# Patient Record
Sex: MALE | Race: WHITE | NOT HISPANIC OR LATINO | Employment: UNEMPLOYED | URBAN - METROPOLITAN AREA
[De-identification: names, ages, dates, MRNs, and addresses within clinical notes are randomized per-mention and may not be internally consistent; named-entity substitution may affect disease eponyms.]

---

## 2023-07-26 ENCOUNTER — HOSPITAL ENCOUNTER (EMERGENCY)
Facility: CLINIC | Age: 48
Discharge: HOME | End: 2023-07-26
Attending: PSYCHIATRY & NEUROLOGY | Admitting: PSYCHIATRY & NEUROLOGY
Payer: COMMERCIAL

## 2023-07-26 ENCOUNTER — TELEPHONE (OUTPATIENT)
Dept: ADDICTION MEDICINE | Facility: CLINIC | Age: 48
End: 2023-07-26
Payer: MEDICARE

## 2023-07-26 VITALS
TEMPERATURE: 98.7 F | OXYGEN SATURATION: 98 % | SYSTOLIC BLOOD PRESSURE: 148 MMHG | WEIGHT: 180 LBS | DIASTOLIC BLOOD PRESSURE: 91 MMHG | HEART RATE: 100 BPM | RESPIRATION RATE: 16 BRPM

## 2023-07-26 DIAGNOSIS — F31.9 BIPOLAR DISORDER, UNSPECIFIED (H): Primary | ICD-10-CM

## 2023-07-26 DIAGNOSIS — Z86.59 HISTORY OF BIPOLAR DISORDER: ICD-10-CM

## 2023-07-26 DIAGNOSIS — F43.22 ADJUSTMENT DISORDER WITH ANXIOUS MOOD: ICD-10-CM

## 2023-07-26 DIAGNOSIS — Z63.5 MARITAL CONFLICT INVOLVING ESTRANGEMENT: ICD-10-CM

## 2023-07-26 PROBLEM — F41.1 GAD (GENERALIZED ANXIETY DISORDER): Status: ACTIVE | Noted: 2023-07-26

## 2023-07-26 PROBLEM — F90.9 ADHD (ATTENTION DEFICIT HYPERACTIVITY DISORDER): Chronic | Status: ACTIVE | Noted: 2023-07-26

## 2023-07-26 PROBLEM — F31.2 BIPOLAR I DISORDER, MOST RECENT EPISODE MANIC, SEVERE WITH PSYCHOTIC FEATURES (H): Status: ACTIVE | Noted: 2023-07-26

## 2023-07-26 PROCEDURE — 99284 EMERGENCY DEPT VISIT MOD MDM: CPT | Performed by: PSYCHIATRY & NEUROLOGY

## 2023-07-26 PROCEDURE — 99285 EMERGENCY DEPT VISIT HI MDM: CPT | Mod: 25

## 2023-07-26 PROCEDURE — 90791 PSYCH DIAGNOSTIC EVALUATION: CPT

## 2023-07-26 SDOH — SOCIAL STABILITY - SOCIAL INSECURITY: DISRUPTION OF FAMILY BY SEPARATION AND DIVORCE: Z63.5

## 2023-07-26 ASSESSMENT — ACTIVITIES OF DAILY LIVING (ADL)
ADLS_ACUITY_SCORE: 35
ADLS_ACUITY_SCORE: 35

## 2023-07-26 NOTE — ED TRIAGE NOTES
Triage Assessment       Row Name 07/26/23 1205       Triage Assessment (Adult)    Airway WDL WDL       Respiratory WDL    Respiratory WDL WDL       Skin Circulation/Temperature WDL    Skin Circulation/Temperature WDL WDL       Cardiac WDL    Cardiac WDL WDL       Peripheral/Neurovascular WDL    Peripheral Neurovascular WDL WDL       Cognitive/Neuro/Behavioral WDL    Cognitive/Neuro/Behavioral WDL WDL

## 2023-07-26 NOTE — CONSULTS
"Diagnostic Evaluation Consultation  Crisis Assessment    Patient Name: Polo Luna  Age:  47 year old  Legal Sex: male  Gender Identity: male  Pronouns:   Race: White  Ethnicity: Not  or   Language: English      Patient was assessed: Virtual: iPad      Patient location: [facility] EMERGENCY DEPARTMENT                             [facility]ED[facility]-G    Referral Data and Chief Complaint  Polo Luna presents to the ED per community partner(s). Patient is presenting to the ED for the following concerns: Significant behavioral change, Anxiety, Paranoia, Worsening psychosocial stress.   Factors that make the mental health crisis life threatening or complex are:  Pt has a history of bipolar disorder, anxiety and ADHD.  Pt was seen by the [facility]  this morning and he told her that he was not taking his psychiatric medications since November, 2022.  Pt has ongoing marital issues with his wife and being paranoid that his wife was cheating on him with her coworker.  Pt also endorsed commanding auditory hallucination as the voices were telling him to, \"Go, sit down, stand up and eat.\"  Pt reportede having visual hallucination as seeing people in the corner of his eyes.  Pt denied having suicidal and homicidal ideations..      Informed Consent and Assessment Methods  Explained the crisis assessment process, including applicable information disclosures and limits to confidentiality, assessed understanding of the process, and obtained consent to proceed with the assessment.  Assessment methods included conducting a formal interview with patient, review of medical records, collaboration with medical staff, and obtaining relevant collateral information from family and community providers when available.  : done     Patient response to interventions: eager to participate, acceptance expressed, verbalizes understanding  Coping skills were attempted to reduce the crisis:  playing guitar, listening to " music, watching TV, movies, drawing, writing, deep breathing exercise, meditation, affirmations, praying, going to the gym to exercise.     History of the Crisis   Per Pt's wife, Pt had angry outburst yesterday as he was yelling and curing at his dad on the phone yesterday, then he yelled at her.  Pt has frequent arguments and fights with his wife about their finance, and Pt alleging that she was cheating on him with her coworker.  Pt also has not been taking his psychiatric medications consistently since November, 2022.    Brief Psychosocial History  Family:  , Children yes (Pt reported he has one daughter.)  Support System:  Parent(s), Other (specify) (Pt identified his close friend, Gage.)  Employment Status:  unemployed  Source of Income:  disability, social security  Financial Environmental Concerns:  unemployed, other (see comments) (Pt and his wife have been arguing about their finance.  Pt's wife wants to get divorce as she asked him to leave their apartment.)  Current Hobbies:  arts/crafts, exercise/fitness, group/social activities, music, poetry reading/writing, television/movies/videos  Barriers in Personal Life:  emotional concerns, financial concerns, lack of motivation, mental health concerns    Significant Clinical History  Current Anxiety Symptoms:  anxious, panic attack  Current Depression/Trauma:  withdrawl/isolation, impaired decision making  Current Somatic Symptoms:  anxious  Current Psychosis/Thought Disturbance:  impulsive, auditory hallucinations, visual hallucinations  Current Eating Symptoms:     Chemical Use History:  Alcohol: None  Benzodiazepines: None  Opiates: None  Cocaine: None  Marijuana: None  Other Use: None   Past diagnosis:  ADHD, Bipolar Disorder, Anxiety Disorder  Family history:  Anxiety Disorder, Depression  Past treatment:  Individual therapy, Inpatient Hospitalization, Psychiatric Medication Management  Details of most recent treatment:  Pt has his current  established outpatient psychiatry for medication management and 1x monthly individual therapy service.  Other relevant history:  Pt denied history of CD treatment but reported history of multiple psychiatric hospitalizations at [place] and [place].       Collateral Information  Is there collateral information: Yes     Collateral information name, relationship, phone number:  wife, Marisa Luna [phone]    What happened today: Marisa reported Pt had a big angry blowout yesterday as he yelled and cursed at his dad over the phone, then he yelled at her as he alleged she was cheating on him with her coworker.  Marisa reported Pt left their home last night to stay in hotel as he used her credit card to pay for the hotel.  Marisa reported Pt has been using her money to buy guitar, headphones and other musical equipments.     What is different about patient's functioning: Marisa reported Pt has poor sleep but normal eating.  Pt appeared to be isolating himself at home.  Marisa reported she just found out today when Pt told the [place]  that he has not been taking his psychiatric medications consistently since November, 2022.  Pt also told the [facility]  that he was having auditory and visual hallucinations.     Concern about alcohol/drug use: yes (Marisa reported Pt used CBD Gummy and cough medicine in the past.)    What do you think the patient needs:      Has patient made comments about wanting to kill themselves/others: no (Marisa reported Pt made threat to his dad yesterday when he talked to him on the phone that he was going to cut him in half.)    If d/c is recommended, can they take part in safety/aftercare planning:  no (Marisa reported she cannot deal with Pt anymore as she did not feel safe having Pt return home today.  Marisa reported she was kicking him out of their apartment.)    Additional collateral information:        Risk Assessment  [place] Suicide Severity Rating Scale Full  Clinical Version:  Suicidal Ideation  Q1 Wish to be Dead (Lifetime): No  Q2 Non-Specific Active Suicidal Thoughts (Lifetime): No     Suicidal Behavior (Lifetime)  Actual Attempt (Lifetime): Yes  Total Number of Actual Attempts (Lifetime): 0  Actual Attempt Description (Lifetime): None reported.  Has subject engaged in non-suicidal self-injurious behavior? (Lifetime): No  Interrupted Attempts (Lifetime): No  Aborted or Self-Interrupted Attempt (Lifetime): No  Preparatory Acts or Behavior (Lifetime): No    [place] Suicide Severity Rating Scale Recent:   Suicidal Ideation (Recent)  Q1 Wished to be Dead (Past Month): no  Q2 Suicidal Thoughts (Past Month): no  Q3 Suicidal Thought Method: no  Q4 Suicidal Intent without Specific Plan: no  Q5 Suicide Intent with Specific Plan: no  Level of Risk per Screen: low risk     Suicidal Behavior (Recent)  Actual Attempt (Past 3 Months): No  Total Number of Actual Attempts (Past 3 Months): 0  Actual Attempt Description (Past 3 Months): None reported.  Has subject engaged in non-suicidal self-injurious behavior? (Past 3 Months): No  Interrupted Attempts (Past 3 Months): No  Total Number of Interrupted Attempts (Past 3 Months): 0  Aborted or Self-Interrupted Attempt (Past 3 Months): No  Total Number of Aborted or Self-Interrupted Attempts (Past 3 Months): 0  Preparatory Acts or Behavior (Past 3 Months): No  Total Number of Preparatory Acts (Past 3 Months): 0    Environmental or Psychosocial Events: legal issues such as DWI, DUI, lawsuit, CPS involvement, etc., loss of a relationship due to divorce/separation, threats to a prized relationship, challenging interpersonal relationships, impulsivity/recklessness, unemployment/underemployment, unstable housing, homelessness, other life stressors, neither working nor attending school, social isolation  Protective Factors: Protective Factors: lives in a responsibly safe and stable environment, able to access care without barriers, help  seeking, supportive ongoing medical and mental health care relationships, constructive use of leisure time, enjoyable activities, resilience    Does the patient have thoughts of harming others? Feels Like Hurting Others: no  Previous Attempt to Hurt Others: no  Current presentation:  (Pt was calm, alert, oriented, engaged and cooperative.)  Is the patient engaging in sexually inappropriate behavior?: no    Is the patient engaging in sexually inappropriate behavior?  no        Mental Status Exam   Affect: Constricted  Appearance: Appropriate  Attention Span/Concentration: Attentive  Eye Contact: Engaged, Variable    Fund of Knowledge: Appropriate   Language /Speech Content: Fluent  Language /Speech Volume: Normal  Language /Speech Rate/Productions: Normal  Recent Memory: Variable  Remote Memory: Variable  Mood: Anxious  Orientation to Person: Yes   Orientation to Place: Yes  Orientation to Time of Day: Yes  Orientation to Date: Yes     Situation (Do they understand why they are here?): Yes  Psychomotor Behavior: Normal  Thought Content: Clear, Hallucinations, Paranoia  Thought Form: Intact, Paranoia     Mini-Cog Assessment  Number of Words Recalled:    Clock-Drawing Test:     Three Item Recall:    Mini-Cog Total Score:       Medication  Psychotropic medications:   Medication Orders - Psychiatric (From admission, onward)      None             Current Care Team  Patient Care Team:  Althea Perez MD as PCP - General (Internal Medicine)    Diagnosis  Patient Active Problem List   Diagnosis Code    Premature ejaculation F52.4    Bipolar I disorder, most recent episode manic, severe with psychotic features (H) F31.2    MISSY (generalized anxiety disorder) F41.1    ADHD (attention deficit hyperactivity disorder) F90.9       Primary Problem This Admission  Active Hospital Problems    Bipolar I disorder, most recent episode manic, severe with psychotic features (H)      MISSY (generalized anxiety disorder)      ADHD (attention  "deficit hyperactivity disorder)        Clinical Summary and Substantiation of Recommendations   Pt is a 47 year old White male with a histroy of bipolar disorder, psychosis, anxiety and ADHD.  Pt presenting in the ER today by [facility] , due to worsening of anxiety, paranoia and delusion.  Pt denied having depression but endorsed anxiety symptoms.  Pt reported having normal sleep and good appetite.  Pt endorsed paranoia and delusion as he believed his wife has been cheating on him with her coworker.  Pt endorsed positive commanding auditory hallucination as the voices were telling him to, \"Go, sitdown, stand, eat.\"  Pt endorsed visual hallucination of seeing people in the corner of his eyes.  Pt denied having suicidal and homicidal ideations.  Pt identified having ongoing marital issues with his wife as they often argue about their finances and Pt alleging that she has been cheating on him with her coworker as triggers leading to his current mental health crisis.  Pt also stopped taking his psychiatric medications since November, 2022.  Pt has no acute tha and psychosis which require psychiatric hospitalization.  Pt was able to identify coping skills and support system to mitigate his current mental health crisis.  Pt was able to engage in his DEC Aftercare plan as he felt safe to return to the community.  Pt was appropriate for outpatient mental health services.                          Patient coping skills attempted to reduce the crisis:  playing guitar, listening to music, watching TV, movies, drawing, writing, deep breathing exercise, meditation, affirmations, praying, going to the gym to exercise.    Disposition  Recommended disposition: Individual Therapy, Medication Management, Family Therapy, Group Home        Reviewed case and recommendations with attending provider. Attending Name: Arnie Hurst MD       Attending concurs with disposition: yes       Patient and/or validated legal guardian " concurs with disposition:   yes       Final disposition:  discharge    Legal status on admission:      Assessment Details   Total duration spent on the patient case in minutes: 30 min     CPT code(s) utilized: 14800 - Psychotherapy for Crisis - 60 (30-74*) min    Km [facility], Psychotherapist  DEC - Triage & Transition Services  Callback: [phone]

## 2023-07-26 NOTE — TELEPHONE ENCOUNTER
10:48 AM: [facility] called wanting to provide collateral and reported that they went to see Pt for an assessment . Pt has not been taking meds and presents with manic symptoms. Pt is diagnosed with Bipolar. Pt's wife also reporting similar concerns. Pt would like to stabilize on MH and get back on medications. [facility] staff is transporting Pt to [facility] ED.

## 2023-07-26 NOTE — DISCHARGE INSTRUCTIONS
Aftercare Plan  If I am feeling unsafe or I am in a crisis, I will: reach out to my parents, close friend, Gage and [facility] crisis line for their support.  Contact my established care providers   Call the National Suicide Prevention Lifeline: 988  Go to the nearest emergency room   Call 911     Writer encourage Pt to take his medications as prescribed and keep all of scheduled appointments with his outpatient service providers.  Pt was appropriate for Crisis Bed service placement, however Pt has medicare insurance.  Writer recommended Pt to continue follow up with his current outpatient psychiatry for medication management and individual therapy services.  Writer recommend Pt to engage in his current therapy session to 1x weekly rather than 1x monthly to increase support.  Writer also recommended family therapy service to address marital issues, however, Pt's wife declined to participate.  Pt reported he can go stay in a hotel as his parents can support him.  DEC coordinator will contact Pt within 1 or 2 business days to ensure coordination of care and provide assistance with appointments.    Warning signs that I or other people might notice when a crisis is developing for me: increased depression, isolation, anxiety, marital conflict, psychosis, tha, substance use, disrupted sleep and appetite.    Things I am able to do on my own to cope or help me feel better: playing guitar, listening to music, journaling, watching TV, movies, drawing, going to the gym to exercise, deep breathing exercise, meditation, affirmations and praying.     Things that I am able to do with others to cope or help me better: playing music in a band and socializing with friends.     Things I can use or do for distraction: playing guitar, listening to music, journaling, watching TV, movies, drawing, going to the gym to exercise, deep breathing exercise, meditation, affirmations and praying.      Changes I can make to support my mental health  and wellness: Taking my psychiatric medications consistently as prescribed.  Joining a peer support group with HEIDI KELLER to increase positive support system.  [facility] (National [place] on Mental Illness) improves the lives of children and adults with mental illnesses and their families by providing free classes on mental illnesses and support groups for adults with mental illnesses, parents and family members. For more information:  Phone: [phone]  Toll free: 4-205-NGQK-HELPS  Website: [email or web address] [email or web address]/      People in my life that I can ask for help: my parents and close friend, Gage.     Your [facility] has a mental health crisis team you can call 24/7: [facility] Mobile Crisis  [phone]     Other things that are important when I'm in crisis: support from my family and friends.  National Suicide Prevention Lifeline at 988  Throughout  [place]: call **CRISIS (**087830)  Crisis Text Line: is available for free, 24/7 by texting [place] to 575459    Additional resources and information: Below is a list of FREE Mental Health Options in the [facility] Area:    [facility] ([facility]  Serves those in emotional crisis with 24-hour, seven-day-a-week crisis counseling, assessment, referral, and medication management.   Suicidal: [phone] Consultation: [phone]  [address] [place] [address] 24/7 Crisis Intervention Center     Walk-in Counseling Center  [phone]  Serves those in need of free outpatient mental health care  Hours: Mon, Wed, Fri 1-3pm; Mon-Thurs 6:30-8:30pm    [facility] for Mental Health  [address] [place] [address]  [phone]     For shelter tonight, start with Adult Shelter Connect Overnight Shelter: [phone]  *Phone lines are closed between 5:30-7:30 pm    [address] (enter on the [address] side near the [facility] corner)  Walk-in Hours: 9 am - 5:30 pm Monday-Friday and 1 pm - 5:30 pm Saturday  and Sunday    [facility]  [phone]  [address] [facility]    [facility]  [phone]  [address] [facility]    [facility]  [phone]  [address] [facility]      [facility]  [phone]  [address] [facility]  [phone]  [address] [place] [address]    To start making a plan for a more long-term solution, contact the Coordinated Entry Homeless Assistance Program:    Coordinated Entry Homeless Assistance  Joint venture between [facility]  [address] [place] [address]  [phone]  Open Wednesdays and Thursdays 8 am-2 pm  The Coordinated Entry System is the [facility]'s approach to organizing and providing housing services for people experiencing homelessness in [facility].  Because housing resources are limited, this process is designed to ensure that individuals and families with the highest vulnerability, service needs, and length of homelessness receive top priority in housing placement.    Assessment changes due to COVID-19 virus    [facility] Services family assessors will now be conducting assessments by phone. If you are working with a family staying in a place other than a [facility]-funded shelter and is eligible for Coordinated Entry, continue to contact [facility]  at [phone] to set up an assessment.    For single adults, [facility] will be suspending drop-in hours at [facility]. To have a Coordinated Entry assessment completed, call the [facility]' certified assessors: Christiano at [phone] or Susanna at [phone] directly to set up a time to meet    Call 211 at any time on any day for questions about services and resources available to you.      Family Shelters    [facility] Shelter Team  [phone]  [address] [facility], Floors 5 & 6              Youth, families & disabled adults    Hours: Mon-Fri 8:00 am-4:30  pm.  After-Hours Shelter Team  [address]      Drop-Ins    [facility]  [phone]  [address] ([facility])              Showers/Laundry (8-11am)              Health Care              Employment Services              Breakfast (7-8 am)              Lunch (11:30am-12:30pm)    Hours: Mon-Sat: Summer 7am-3pm; Winter 7am-4pm.      [facility] [phone]  [address] [place] [address]  Hours: Mon, Wed and Fri 9:00-11:30 am      Clothing    [facility]  [phone]  [address]  Hours: M-Th 10-11:30am & 1:30-3:30pm; Sat/Sun 9:30-10:30 am      Free Meals & Showers    [facility]  [phone]  [address] [place] [address]  Dinner: Mon-Fri 5:30-6:30pm (No showers)    [facility]  [phone]  Meals ([address]): Women & Children M-F 8:30-9am; Everyone: M-F 12-1pm; Sat/Sun, 10:30-11:30 am  Showers ([address]): Mon-Fri 9-10 am    [facility]  [phone]  [address]  Dinner: Thurs - Mon 6 pm  Showers: Daily 8 - 4:30 pm    Health Care    [facility] for the [facility]eless  [phone]  Clinics are in [address] [place] shelters/drop-in centers.  Hours: Mon-Fri at varying sites. Call for sites/times. No insurance or appts required to receive care.  Clinic sites: [facility], [facility], [facility], [facility], [facility], [facility], [facility], [facility], [facility], [facility]         Crisis Lines  Crisis Text Line  Text 212006  You will be connected with a trained live crisis counselor to provide support.    Por rudy, texto  BELINDA a 831219 o texto a 442-AYUDAME en WhatsApp    [employer] (LGBTQ Youth Crisis Line)  8.992.383.9020  text START to 146-478      Community Resources  Fast Tracker  Linking people to mental health and substance use disorder resources  Hexagon.org     [place] Mental Health Warm Line  Peer to peer support  " Monday thru Saturday, 12 pm to 10 pm  814.575.2297 or 8.579.889.6589  Text \"Support\" to 02728    National [place] on Mental Illness (KRISHNA)  915.470.6861 or 1.888.KRISHNA.HELPS      Mental Health Apps  My3  https://Heliotrope Technologiespp.org/    VirtualHopeBox  https://Euroffice/apps/virtual-hope-box/      Additional Information  Today you were seen by a licensed mental health professional through Triage and Transition services, [facility] Providers ([facility])  for a crisis assessment in the Emergency Department at [facility].  It is recommended that you follow up with your established providers (psychiatrist, mental health therapist, and/or primary care doctor - as relevant) as soon as possible. Coordinators from [facility] will be calling you in the next 24-48 hours to ensure that you have the resources you need.  You can also contact [facility] coordinators directly at [phone]. You may have been scheduled for or offered an appointment with a mental health provider. [facility] maintains an extensive network of licensed behavioral health providers to connect patients with the services they need.  We do not charge providers a fee to participate in our referral network.  We match patients with providers based on a patient's specific needs, insurance coverage, and location.  Our first effort will be to refer you to a provider within your care system, and will utilize providers outside your care system as needed.         "

## 2023-07-26 NOTE — ED PROVIDER NOTES
ED Provider Note  [facility]      History     Chief Complaint   Patient presents with    Delusional     Wife told him to come in to be admitted to the psych hernandez as he is having delusional thoughts of his wife cheating on him     HPI  Polo Luna is a 47 year old male who is here due to concerns for unstable bipolar symptoms, that he is paranoid and delusional that his wife was having an affair.  Patient had yelled at wife as he alleged she was cheating on him with her coworker.  Wife reported patient left their home last night to stay in hotel as he used her credit card to pay for the hotel, that he has been using her money to buy guitar, headphones and other musical equipments.      Patient has poor sleep but normal eating.  He allegedly is isolating himself at home. [facility]  evaluated and determined that he has not been taking his psychiatric medications consistently since November, 2022. He admits to not taking his risperidone, but has continued with his lithium, klonopin and Adderall. Patient also told the KERRI  that he was having auditory and visual hallucinations. He however reports the hallucinations have been chronic and are not bothersome. He denies any command hallucinations.    Patient does not feel he needs to be here nor hospitalized nor have an intervention. He understands that his wife does not want him home and he plans on going to a hotel.    Past Medical History  Past Medical History:   Diagnosis Date    Bipolar disorder (H)      Past Surgical History:   Procedure Laterality Date    ANKLE SURGERY      right     ClonAZEPAM (KLONOPIN) 0.5 MG tablet  lithium (ESKALITH) 450 MG CR tablet  quetiapine (SEROQUEL) 100 MG tablet  quetiapine (SEROQUEL) 200 MG tablet  zolpidem (AMBIEN) 5 MG tablet      No Known Allergies  Family History  No family history on file.  Social History   Social History     Tobacco Use    Smoking status: Passive Smoke Exposure -  Never Smoker   Substance Use Topics    Alcohol use: Yes    Drug use: No         A medically appropriate review of systems was performed with pertinent positives and negatives noted in the HPI, and all other systems negative.    Physical Exam   BP: (!) 148/91  Pulse: 100  Temp: 98.7  F (37.1  C)  Resp: 16  Weight: 81.6 kg (180 lb)  SpO2: 98 %  Physical Exam  Vitals and nursing note reviewed.   HENT:      Head: Normocephalic.   Eyes:      Pupils: Pupils are equal, round, and reactive to light.   Pulmonary:      Effort: Pulmonary effort is normal.   Musculoskeletal:         General: Normal range of motion.      Cervical back: Normal range of motion.   Neurological:      General: No focal deficit present.      Mental Status: He is alert.   Psychiatric:         Attention and Perception: Attention and perception normal.         Mood and Affect: Mood and affect normal.         Speech: Speech normal.         Behavior: Behavior normal. Behavior is not agitated, aggressive, hyperactive or combative. Behavior is cooperative.         Thought Content: Thought content normal. Thought content is not paranoid or delusional. Thought content does not include homicidal or suicidal ideation.         Cognition and Memory: Cognition and memory normal.         Judgment: Judgment normal.           ED Course, Procedures, & Data      Procedures       ED Course Selections: A consult was attained from the [facility] service. The case was discussed with the  from that service. The consulting service's recommendations were provided at 2:30 PM.  10 minute spent discussing case, care and disposition.  10 minutes spent reviewing prior records and interventions.        No results found for any visits on 07/26/23.  Medications - No data to display  Labs Ordered and Resulted from Time of ED Arrival to Time of ED Departure - No data to display  No orders to display          Critical care was not performed.     Medical Decision Making  The patient's  presentation was of high complexity (a chronic illness severe exacerbation, progression, or side effect of treatment).    The patient's evaluation involved:  an assessment requiring an independent historian (see separate area of note for details)  review of external note(s) from 3+ sources (see separate area of note for details)    The patient's management necessitated high risk (a decision regarding hospitalization).    Assessment & Plan    Patient is here due to concerns that he may be delusion and paranoid regarding his wife's infidelity. He has history of bipolar disorder and there was concern that he has not been taking his meds. Patient admits to not taking risperidone but continues with lithium. He has been taking his prescribed klonopin and Adderall.    Patient does not exhibit delusional thinking nor appears unstable with any bipolar symptoms or signs. He agrees that his wife is not having an affair. Patient would like to be discharged. I do not find patient holdable as there is no evidence of acute safety concern nor grave disability. He was discharged. He was encouraged to follow-up established care and services.    I have reviewed the nursing notes. I have reviewed the findings, diagnosis, plan and need for follow up with the patient.    New Prescriptions    No medications on file       Final diagnoses:   Adjustment disorder with anxious mood   History of bipolar disorder   Marital conflict involving estrangement       Arnie Hurst MD  [facility] EMERGENCY DEPARTMENT  7/26/2023     Arnie Hurst MD  07/26/23 3247

## 2023-08-12 ENCOUNTER — HOSPITAL ENCOUNTER (EMERGENCY)
Facility: CLINIC | Age: 48
Discharge: HOME | End: 2023-08-13
Attending: STUDENT IN AN ORGANIZED HEALTH CARE EDUCATION/TRAINING PROGRAM | Admitting: STUDENT IN AN ORGANIZED HEALTH CARE EDUCATION/TRAINING PROGRAM
Payer: COMMERCIAL

## 2023-08-12 DIAGNOSIS — F41.0 PANIC ATTACK: ICD-10-CM

## 2023-08-12 PROCEDURE — 99284 EMERGENCY DEPT VISIT MOD MDM: CPT | Performed by: STUDENT IN AN ORGANIZED HEALTH CARE EDUCATION/TRAINING PROGRAM

## 2023-08-12 PROCEDURE — 99285 EMERGENCY DEPT VISIT HI MDM: CPT | Mod: 25 | Performed by: STUDENT IN AN ORGANIZED HEALTH CARE EDUCATION/TRAINING PROGRAM

## 2023-08-12 PROCEDURE — 250N000013 HC RX MED GY IP 250 OP 250 PS 637: Performed by: STUDENT IN AN ORGANIZED HEALTH CARE EDUCATION/TRAINING PROGRAM

## 2023-08-12 RX ORDER — OLANZAPINE 5 MG/1
5 TABLET, ORALLY DISINTEGRATING ORAL ONCE
Status: COMPLETED | OUTPATIENT
Start: 2023-08-12 | End: 2023-08-12

## 2023-08-12 RX ADMIN — OLANZAPINE 5 MG: 5 TABLET, ORALLY DISINTEGRATING ORAL at 22:24

## 2023-08-12 ASSESSMENT — ACTIVITIES OF DAILY LIVING (ADL): ADLS_ACUITY_SCORE: 35

## 2023-08-13 VITALS
BODY MASS INDEX: 26.53 KG/M2 | DIASTOLIC BLOOD PRESSURE: 87 MMHG | OXYGEN SATURATION: 99 % | SYSTOLIC BLOOD PRESSURE: 129 MMHG | TEMPERATURE: 97.9 F | RESPIRATION RATE: 20 BRPM | WEIGHT: 185.3 LBS | HEART RATE: 87 BPM | HEIGHT: 70 IN

## 2023-08-13 PROCEDURE — 90791 PSYCH DIAGNOSTIC EVALUATION: CPT

## 2023-08-13 RX ORDER — HYDROXYZINE HYDROCHLORIDE 25 MG/1
25 TABLET, FILM COATED ORAL 3 TIMES DAILY PRN
Qty: 10 TABLET | Refills: 0 | Status: SHIPPED | OUTPATIENT
Start: 2023-08-13

## 2023-08-13 RX ORDER — OLANZAPINE 10 MG/1
10 TABLET, ORALLY DISINTEGRATING ORAL 2 TIMES DAILY PRN
Qty: 30 TABLET | Refills: 0 | Status: SHIPPED | OUTPATIENT
Start: 2023-08-13

## 2023-08-13 ASSESSMENT — ACTIVITIES OF DAILY LIVING (ADL): ADLS_ACUITY_SCORE: 35

## 2023-08-13 NOTE — CONSULTS
"Diagnostic Evaluation Consultation  Crisis Assessment    Patient Name: Polo Luna  Age:  47 year old  Legal Sex: male  Gender Identity: male  Pronouns:   Race: White  Ethnicity: Not  or   Language: English      Patient was assessed: Virtual: [employer] Telemedicine Start Time: 0020 Telemedicine Stop Time: 0054  Patient location: [facility] EMERGENCY DEPARTMENT                                 Referral Data and Chief Complaint  Polo Luna presents to the ED by  self. Patient is presenting to the ED for the following concerns: Significant behavioral change, Anxiety, Paranoia, Worsening psychosocial stress, Suicidal ideation, Substance use.   Factors that make the mental health crisis life threatening or complex are:  Pt has a history of bipolar disorder, anxiety and ADHD.  Pt reports having multiple panic attacks and unamanageable anxiety after he stopped using cough medicine. He states he stopped using cough medicine yesteday. He also reprots running out of his anxiety medicine. Pt has ongoing marital issues with his wife and being paranoid that his wife was cheating on him with her coworker.  Pt also endorsed commanding auditory hallucination as the voices were telling him to, \"Go, sit down, stand up and eat.\"  Pt reported having visual hallucination as seeing people in the corner of his eyes.  Pt endorsed having suicidal ideations but denies having plans or intent..      Informed Consent and Assessment Methods  Explained the crisis assessment process, including applicable information disclosures and limits to confidentiality, assessed understanding of the process, and obtained consent to proceed with the assessment.  Assessment methods included conducting a formal interview with patient, review of medical records, collaboration with medical staff, and obtaining relevant collateral information from family and community providers when available.  : done     Patient response to " "interventions: eager to participate, acceptance expressed, verbalizes understanding  Coping skills were attempted to reduce the crisis:  playing guitar, listening to music, watching TV, movies, drawing, writing, deep breathing exercise, meditation, affirmations, praying, going to the gym to exercise.     History of the Crisis   Patient reports he stopped taking cough syrup yesterday and has been having ongoing panic attacks since. Patient states his anxiety meds \"went missing\" Patient reports ongoing symptoms of tha, depressive symptoms and suicidal ideations.    Brief Psychosocial History  Family:  , Children    Support System:  Wife  Employment Status:  disabled, unemployed  Source of Income:  disability, social security  Financial Environmental Concerns:  unemployed, other (see comments)  Current Hobbies:  music, other (see comments) (Play guitar)  Barriers in Personal Life:  emotional concerns, financial concerns, lack of motivation, mental health concerns, behavioral concerns    Significant Clinical History  Current Anxiety Symptoms:  anxious, panic attack, excessive worry, racing thoughts, shortness of breath or racing heart  Current Depression/Trauma:  difficulty concentrating, withdrawl/isolation, helplessness, hoplessness, sadness, low self esteem, irritable, avoidance, crying or feels like crying, thoughts of death/suicide  Current Somatic Symptoms:  excessive worry, racing thoughts, shortness of breath or racing heart, anxious, sweating, flushing, shaking  Current Psychosis/Thought Disturbance:  impulsive, distractability, flight of ideas, agitation, visual hallucinations, auditory hallucinations  Current Eating Symptoms:  loss of appetite  Chemical Use History:  Other Use: Cold Meds  Last Use:: 08/11/23  Withdrawal Symptoms:  (cravings.)   Past diagnosis:  ADHD, Bipolar Disorder, Anxiety Disorder, Depression  Family history:     Past treatment:     Details of most recent treatment:  Pt has his " current established outpatient psychiatry for medication management and 1x monthly individual therapy service. Patient is currently looking referrals for day treatment program.  Other relevant history:  Pt denied history of CD treatment but reported history of multiple psychiatric hospitalizations at [place] and [facility].       Collateral Information  Is there collateral information: no.     Collateral information name, relationship, phone number:  Marisa Luna, spouse @ [phone].  was unable to reach her. Left a vm and requested a call back.       Risk Assessment  Rankin Suicide Severity Rating Scale Full Clinical Version:  Suicidal Ideation  Q6 Suicide Behavior (Lifetime): no         Rankin Suicide Severity Rating Scale Recent:   Suicidal Ideation (Recent)  Q1 Wished to be Dead (Past Month): yes  Q2 Suicidal Thoughts (Past Month): yes  Q3 Suicidal Thought Method: no  Q4 Suicidal Intent without Specific Plan: no  Q5 Suicide Intent with Specific Plan: no  Level of Risk per Screen: low risk  Intensity of Ideation (Recent)  Most Severe Ideation Rating (Past 1 Month): 2  Description of Most Severe Ideation (Past 1 Month): reports he was feeling overwhelmed with everything. Reports everything was happening at once.  Suicidal Behavior (Recent)  Actual Attempt (Past 3 Months): No  Interrupted Attempts (Past 3 Months): No  Aborted or Self-Interrupted Attempt (Past 3 Months): No  Preparatory Acts or Behavior (Past 3 Months): No    Environmental or Psychosocial Events: legal issues such as DWI, DUI, lawsuit, CPS involvement, etc., loss of a relationship due to divorce/separation, threats to a prized relationship, challenging interpersonal relationships, impulsivity/recklessness, unemployment/underemployment, other life stressors, neither working nor attending school, social isolation, helplessness/hopelessness, excessive debt, poor finances, recent life events (see comment)  Protective Factors: Protective  Factors: lives in a responsibly safe and stable environment, able to access care without barriers, help seeking, supportive ongoing medical and mental health care relationships, constructive use of leisure time, enjoyable activities, resilience    Does the patient have thoughts of harming others? Previous Attempt to Hurt Others: no  Is the patient engaging in sexually inappropriate behavior?: no    Is the patient engaging in sexually inappropriate behavior?  no        Mental Status Exam   Affect: Constricted  Appearance: Appropriate  Attention Span/Concentration: Attentive  Eye Contact: Engaged, Variable    Fund of Knowledge: Appropriate   Language /Speech Content: Fluent  Language /Speech Volume: Normal  Language /Speech Rate/Productions: Normal  Recent Memory: Variable  Remote Memory: Variable  Mood: Anxious  Orientation to Person: Yes   Orientation to Place: Yes  Orientation to Time of Day: Yes  Orientation to Date: Yes     Situation (Do they understand why they are here?): Yes  Psychomotor Behavior: Normal  Thought Content: Clear, Hallucinations, Paranoia, Suicidal  Thought Form: Intact, Paranoia     Mini-Cog Assessment  Number of Words Recalled:    Clock-Drawing Test:     Three Item Recall:    Mini-Cog Total Score:       Medication  Psychotropic medications:   Medication Orders - Psychiatric (From admission, onward)      Start     Dose/Rate Route Frequency Ordered Stop    08/13/23 0000  OLANZapine zydis (ZYPREXA) 10 MG ODT         10 mg Oral 2 TIMES DAILY PRN 08/13/23 0104      08/13/23 0000  hydrOXYzine (ATARAX) 25 MG tablet         25 mg Oral 3 TIMES DAILY PRN 08/13/23 0104               Current Care Team  Patient Care Team:  Althea Perez MD as PCP - General (Internal Medicine)  Justyna- as Therapist    Diagnosis  Patient Active Problem List   Diagnosis Code    Premature ejaculation F52.4    Bipolar I disorder, most recent episode manic, severe with psychotic features (H) F31.2    MISSY (generalized anxiety  "disorder) F41.1    ADHD (attention deficit hyperactivity disorder) F90.9       Primary Problem This Admission  Active Hospital Problems  Bipolar disorder, current episode mixed; severe with psychotic features - (F31.64)  Generalized anxiety disorder - (F41.1)  Unspecified attention-deficit/hyperactivity disorder - (F90.9)    Clinical Summary and Substantiation of Recommendations   Pt is a 47 year old White male with a histroy of bipolar disorder, psychosis, anxiety and ADHD. Pt presenting in the ER today due to worsening panic attacks. anxiety, paranoia and suicidal ideations. Patient reports feeling depressed, hopeless and helpless. Pt reported having normal sleep and loss of appetite. Pt endorsed paranoia and delusion as he believed his wife has been cheating on him with her coworker. Pt endorsed positive commanding auditory hallucination as the voices were telling him to, \"Go, sitdown, stand, eat.\" Pt endorsed visual hallucination of seeing people in the corner of his eyes. Pt identified having ongoing marital issues with his wife as they often argue about their finances and Pt alleging that she has been cheating on him with her coworker as triggers leading to his current mental health crisis. Patient reports he also has been struggling with manic symptoms. He states he somes feels high energy and has been spending money by buying things. He reports he takes his medications as prescribed. Pt has no acute tha and psychosis which require psychiatric hospitalization. Pt was able to identify coping skills and support system to mitigate his current mental health crisis. Pt was able to engage in his [facility] Aftercare plan as he felt safe to return to the community. Pt was appropriate for outpatient mental health services. Patient has a psychiatry appointment scheduled for 8/16. He reports seeing his therapist weekly. Patient is interested in getting referrals for day treatment programming. Behavioral health care " coordination team will reach out to patient and help schedule a day treatment intake appointment within the next 48 hours.      Patient coping skills attempted to reduce the crisis:  playing guitar, listening to music, watching TV, movies, drawing, writing, deep breathing exercise, meditation, affirmations, praying, going to the gym to exercise.    Disposition  Recommended disposition: Individual Therapy, Medication Management, Programmatic Care        Reviewed case and recommendations with attending provider. Attending Name: Alo Albarran MD       Attending concurs with disposition: yes       Patient and/or validated legal guardian concurs with disposition:   yes       Final disposition:  discharge    Legal status on admission:      Assessment Details   Total duration spent on the patient case in minutes: 34 min     CPT code(s) utilized: 25295 - Psychotherapy for Crisis - 60 (30-74*) min    RENETTA Albrecht, Psychotherapist  [facility] - Triage & Transition Services  Callback: [phone]

## 2023-08-13 NOTE — DISCHARGE INSTRUCTIONS
You were seen today for a panic attack. As we discussed  - take Zyprexa and hydroxyzine as needed for anxiety  - Follow up with psychiatry as outpatient, they are trying to set up outpatient therapy for you and will call you.  - Please return to the emergency department if you have severe worsening in your symptoms

## 2023-08-13 NOTE — ED PROVIDER NOTES
"ED Provider Note  [facility]      History     Chief Complaint   Patient presents with    Panic Attack    Addiction Problem    Suicidal     SI x2 days. Denies a plan        HPI    Polo Luna is a 47 year old year old with history of bipolar 1 disorder who presents to the ED for a psych eval. he feels depressed but has no current plan for suicide.  He is feeling like things are getting out of control and wants more help.  he was kicked out by his wife 4 weeks ago which has made his anxiety worse. 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 He is looking for detox or treatment for it. He says he has been admitted before. He ran out of Klonopin for anxiety 2 days ago. Denies drinking alcohol.  He said today he was alone which made his symptoms worse which is why he came in.  He is also interested in being admitted for observation, looking for psychiatric help.    A medically appropriate review of systems was performed with pertinent positives and negatives noted in the HPI, and all other systems negative.    Physical Exam   BP (!) 157/99   Pulse 80   Temp 97.9  F (36.6  C) (Oral)   Resp 20   Ht 1.778 m (5' 10\")   Wt 84.1 kg (185 lb 4.8 oz)   SpO2 100%   BMI 26.59 kg/m     Physical Exam  General: no acute distress.  HENT: MMM, no oropharyngeal lesions  Eyes: PERRL, normal sclerae  Neck: non-tender, supple  Cardio: Regular rate and rhythm. Extremities well perfused  Resp: Normal work of breathing, no accessory muscle use  Abdomen: non tender, non-distended, no rebound, no guarding  Neuro: alert and oriented. Grossly normal strength and sensation in all extremities.   MSK: no deformities. Grossly normal ROM in extremities.   Integumentary/Skin: no rashes or lesions      Procedures, & Data      Procedures    Critical care was not performed.     Medical Decision " Making  The patient's presentation was of moderate complexity (an undiagnosed new problem with uncertain diagnosis).    The patient's evaluation involved:  discussion of management or test interpretation with another health professional (see separate area of note for details)    The patient's management necessitated moderate risk (prescription drug management including medications given in the ED) and moderate risk (limitations due to social determinants of health (chemical dependence)).    Medical Decision Making and ED Course    Patient is a 47-year-old who has a history of anxiety, dextromethorphan abuse, depression who presents for depression, anxiety, and likely panic attack.  Denies any substance use today.  He ran out of his Klonopin and is hoping to get something for anxiety today.  He is also seeking help for his current mental health problems and is requesting being admitted.      ED Course as of 08/13/23 0141   Sat Aug 12, 2023   2245 [facility]  planning to see   Sun Aug 13, 2023   0101 [facility] recommended discharge, they will schedule outpatient day treatment. He will discharge with day treatment, medication management, and therapy     Overall he does not have suicidal ideation and I do not think he necessitates inpatient management of his psychiatric concerns but he does definitely need help.  I think we have a good plan in place for him as an outpatient.  I did not want to represcribe his benzodiazepine prescription so I did send him with a prescription of hydroxyzine and olanzapine for his anxiety.  Recommended he follow-up with his psychiatric provider and return if he has any thoughts of harming himself or others.    I have reviewed the nursing notes. I have reviewed the findings, diagnosis, and plan with the patient.    Impression   Anxiety  Depression      I, Barry Trinidad, am serving as a trained medical scribe to document services personally performed by Alo Albarran MD based on the  provider's statements to me on August 12, 2023.  This document has been checked and approved by the attending provider.    I, Alo Albarran MD, was physically present and have reviewed and verified the accuracy of this note documented by Barry Trinidad medical scribe.      Alo Albarran MD  [facility] EMERGENCY DEPARTMENT  August 12, 2023       Alo Albarran MD  08/13/23 0145

## 2023-08-13 NOTE — ED TRIAGE NOTES
Patient said he's currently homeless. He was kicked out by his wife 4 weeks ago. He ran out of Klonopin for anxiety. He also admitted abusing over the counter cough syrup for 4 years. Denies drinking alcohol.      Triage Assessment       Row Name 08/12/23 5533       Triage Assessment (Adult)    Airway WDL WDL       Respiratory WDL    Respiratory WDL WDL       Skin Circulation/Temperature WDL    Skin Circulation/Temperature WDL WDL       Cardiac WDL    Cardiac WDL WDL       Cognitive/Neuro/Behavioral WDL    Cognitive/Neuro/Behavioral WDL WDL